# Patient Record
Sex: FEMALE | Race: WHITE | NOT HISPANIC OR LATINO | Employment: FULL TIME | URBAN - METROPOLITAN AREA
[De-identification: names, ages, dates, MRNs, and addresses within clinical notes are randomized per-mention and may not be internally consistent; named-entity substitution may affect disease eponyms.]

---

## 2022-04-10 ENCOUNTER — HISTORICAL (OUTPATIENT)
Dept: ADMINISTRATIVE | Facility: HOSPITAL | Age: 32
End: 2022-04-10

## 2022-04-27 VITALS
WEIGHT: 139.75 LBS | DIASTOLIC BLOOD PRESSURE: 70 MMHG | BODY MASS INDEX: 21.93 KG/M2 | OXYGEN SATURATION: 97 % | SYSTOLIC BLOOD PRESSURE: 107 MMHG | HEIGHT: 67 IN

## 2022-08-28 ENCOUNTER — LAB VISIT (OUTPATIENT)
Dept: LAB | Facility: HOSPITAL | Age: 32
End: 2022-08-28
Attending: OBSTETRICS & GYNECOLOGY
Payer: COMMERCIAL

## 2022-08-29 ENCOUNTER — HOSPITAL ENCOUNTER (OUTPATIENT)
Facility: HOSPITAL | Age: 32
LOS: 1 days | Discharge: HOME | End: 2022-08-29
Attending: OBSTETRICS & GYNECOLOGY | Admitting: OBSTETRICS & GYNECOLOGY
Payer: COMMERCIAL

## 2022-08-29 VITALS
HEART RATE: 67 BPM | DIASTOLIC BLOOD PRESSURE: 82 MMHG | HEIGHT: 67 IN | SYSTOLIC BLOOD PRESSURE: 116 MMHG | TEMPERATURE: 98 F | WEIGHT: 136.88 LBS | BODY MASS INDEX: 21.48 KG/M2 | RESPIRATION RATE: 18 BRPM

## 2022-08-29 DIAGNOSIS — O00.90 ECTOPIC PREGNANCY: ICD-10-CM

## 2022-08-29 LAB
ALBUMIN SERPL-MCNC: 4.1 GM/DL (ref 3.5–5)
ALBUMIN/GLOB SERPL: 1.4 RATIO (ref 1.1–2)
ALP SERPL-CCNC: 114 UNIT/L (ref 40–150)
ALT SERPL-CCNC: 21 UNIT/L (ref 0–55)
AST SERPL-CCNC: 19 UNIT/L (ref 5–34)
B-HCG FREE SERPL-ACNC: 1404.96 MIU/ML
BASOPHILS # BLD AUTO: 0.05 X10(3)/MCL (ref 0–0.2)
BASOPHILS NFR BLD AUTO: 0.8 %
BILIRUBIN DIRECT+TOT PNL SERPL-MCNC: 0.5 MG/DL
BUN SERPL-MCNC: 13.7 MG/DL (ref 7–18.7)
CALCIUM SERPL-MCNC: 9.8 MG/DL (ref 8.4–10.2)
CHLORIDE SERPL-SCNC: 108 MMOL/L (ref 98–107)
CO2 SERPL-SCNC: 27 MMOL/L (ref 22–29)
CREAT SERPL-MCNC: 0.72 MG/DL (ref 0.55–1.02)
EOSINOPHIL # BLD AUTO: 0.1 X10(3)/MCL (ref 0–0.9)
EOSINOPHIL NFR BLD AUTO: 1.5 %
ERYTHROCYTE [DISTWIDTH] IN BLOOD BY AUTOMATED COUNT: 12.3 % (ref 11.5–17)
GFR SERPLBLD CREATININE-BSD FMLA CKD-EPI: >60 MLS/MIN/1.73/M2
GLOBULIN SER-MCNC: 2.9 GM/DL (ref 2.4–3.5)
GLUCOSE SERPL-MCNC: 110 MG/DL (ref 74–100)
HCT VFR BLD AUTO: 46.5 % (ref 37–47)
HGB BLD-MCNC: 14.9 GM/DL (ref 12–16)
IMM GRANULOCYTES # BLD AUTO: 0.02 X10(3)/MCL (ref 0–0.04)
IMM GRANULOCYTES NFR BLD AUTO: 0.3 %
LYMPHOCYTES # BLD AUTO: 2.96 X10(3)/MCL (ref 0.6–4.6)
LYMPHOCYTES NFR BLD AUTO: 45 %
MCH RBC QN AUTO: 29.3 PG (ref 27–31)
MCHC RBC AUTO-ENTMCNC: 32 MG/DL (ref 33–36)
MCV RBC AUTO: 91.4 FL (ref 80–94)
MONOCYTES # BLD AUTO: 0.42 X10(3)/MCL (ref 0.1–1.3)
MONOCYTES NFR BLD AUTO: 6.4 %
NEUTROPHILS # BLD AUTO: 3 X10(3)/MCL (ref 2.1–9.2)
NEUTROPHILS NFR BLD AUTO: 46 %
NRBC BLD AUTO-RTO: 0 %
PLATELET # BLD AUTO: 235 X10(3)/MCL (ref 130–400)
PMV BLD AUTO: 9.9 FL (ref 7.4–10.4)
POTASSIUM SERPL-SCNC: 3.9 MMOL/L (ref 3.5–5.1)
PROT SERPL-MCNC: 7 GM/DL (ref 6.4–8.3)
RBC # BLD AUTO: 5.09 X10(6)/MCL (ref 4.2–5.4)
SODIUM SERPL-SCNC: 139 MMOL/L (ref 136–145)
WBC # SPEC AUTO: 6.6 X10(3)/MCL (ref 4.5–11.5)

## 2022-08-29 PROCEDURE — 63600175 PHARM REV CODE 636 W HCPCS: Performed by: OBSTETRICS & GYNECOLOGY

## 2022-08-29 PROCEDURE — 80053 COMPREHEN METABOLIC PANEL: CPT | Performed by: OBSTETRICS & GYNECOLOGY

## 2022-08-29 PROCEDURE — 85025 COMPLETE CBC W/AUTO DIFF WBC: CPT | Performed by: OBSTETRICS & GYNECOLOGY

## 2022-08-29 PROCEDURE — 36415 COLL VENOUS BLD VENIPUNCTURE: CPT | Performed by: OBSTETRICS & GYNECOLOGY

## 2022-08-29 PROCEDURE — 84702 CHORIONIC GONADOTROPIN TEST: CPT | Performed by: OBSTETRICS & GYNECOLOGY

## 2022-08-29 RX ORDER — PROCHLORPERAZINE EDISYLATE 5 MG/ML
5 INJECTION INTRAMUSCULAR; INTRAVENOUS EVERY 6 HOURS PRN
Status: DISCONTINUED | OUTPATIENT
Start: 2022-08-29 | End: 2022-08-29 | Stop reason: HOSPADM

## 2022-08-29 RX ORDER — METHOTREXATE 25 MG/ML
50 INJECTION INTRA-ARTERIAL; INTRAMUSCULAR; INTRATHECAL; INTRAVENOUS ONCE
Status: COMPLETED | OUTPATIENT
Start: 2022-08-29 | End: 2022-08-29

## 2022-08-29 RX ORDER — ONDANSETRON 4 MG/1
8 TABLET, ORALLY DISINTEGRATING ORAL EVERY 8 HOURS PRN
Status: DISCONTINUED | OUTPATIENT
Start: 2022-08-29 | End: 2022-08-29 | Stop reason: HOSPADM

## 2022-08-29 RX ADMIN — METHOTREXATE 85 MG: 25 SOLUTION INTRA-ARTERIAL; INTRAMUSCULAR; INTRATHECAL; INTRAVENOUS at 02:08

## 2022-09-01 ENCOUNTER — LAB VISIT (OUTPATIENT)
Dept: LAB | Facility: HOSPITAL | Age: 32
End: 2022-09-01
Attending: OBSTETRICS & GYNECOLOGY
Payer: COMMERCIAL

## 2022-09-01 DIAGNOSIS — O00.90 UNSPECIFIED ECTOPIC PREGNANCY WITHOUT INTRAUTERINE PREGNANCY: Primary | ICD-10-CM

## 2022-09-01 LAB — B-HCG FREE SERPL-ACNC: 1672.3 MIU/ML

## 2022-09-01 PROCEDURE — 36415 COLL VENOUS BLD VENIPUNCTURE: CPT

## 2022-09-01 PROCEDURE — 84702 CHORIONIC GONADOTROPIN TEST: CPT

## 2022-09-04 ENCOUNTER — HOSPITAL ENCOUNTER (OUTPATIENT)
Facility: HOSPITAL | Age: 32
Discharge: HOME | End: 2022-09-04
Attending: OBSTETRICS & GYNECOLOGY | Admitting: OBSTETRICS & GYNECOLOGY
Payer: COMMERCIAL

## 2022-09-04 VITALS
SYSTOLIC BLOOD PRESSURE: 115 MMHG | DIASTOLIC BLOOD PRESSURE: 76 MMHG | HEIGHT: 66 IN | TEMPERATURE: 98 F | BODY MASS INDEX: 21.76 KG/M2 | RESPIRATION RATE: 18 BRPM | WEIGHT: 135.38 LBS | HEART RATE: 66 BPM

## 2022-09-04 DIAGNOSIS — O00.90 ECTOPIC PREGNANCY: ICD-10-CM

## 2022-09-04 PROBLEM — O02.81 INAPPROPRIATE CHANGE IN QUANTITATIVE HCG IN EARLY PREGNANCY: Status: ACTIVE | Noted: 2022-09-04

## 2022-09-04 PROBLEM — O02.81 INAPPROPRIATE CHANGE IN QUANTITATIVE HCG IN EARLY PREGNANCY: Status: RESOLVED | Noted: 2022-09-04 | Resolved: 2022-09-04

## 2022-09-04 LAB
ALBUMIN SERPL-MCNC: 4.2 GM/DL (ref 3.5–5)
ALBUMIN/GLOB SERPL: 1.5 RATIO (ref 1.1–2)
ALP SERPL-CCNC: 108 UNIT/L (ref 40–150)
ALT SERPL-CCNC: 32 UNIT/L (ref 0–55)
AST SERPL-CCNC: 21 UNIT/L (ref 5–34)
BASOPHILS # BLD AUTO: 0.06 X10(3)/MCL (ref 0–0.2)
BASOPHILS NFR BLD AUTO: 0.7 %
BILIRUBIN DIRECT+TOT PNL SERPL-MCNC: 0.6 MG/DL
BUN SERPL-MCNC: 11.6 MG/DL (ref 7–18.7)
CALCIUM SERPL-MCNC: 10 MG/DL (ref 8.4–10.2)
CHLORIDE SERPL-SCNC: 106 MMOL/L (ref 98–107)
CO2 SERPL-SCNC: 26 MMOL/L (ref 22–29)
CREAT SERPL-MCNC: 0.75 MG/DL (ref 0.55–1.02)
EOSINOPHIL # BLD AUTO: 0.23 X10(3)/MCL (ref 0–0.9)
EOSINOPHIL NFR BLD AUTO: 2.7 %
ERYTHROCYTE [DISTWIDTH] IN BLOOD BY AUTOMATED COUNT: 12.1 % (ref 11.5–17)
GFR SERPLBLD CREATININE-BSD FMLA CKD-EPI: >60 MLS/MIN/1.73/M2
GLOBULIN SER-MCNC: 2.8 GM/DL (ref 2.4–3.5)
GLUCOSE SERPL-MCNC: 78 MG/DL (ref 74–100)
HCT VFR BLD AUTO: 46.8 % (ref 37–47)
HGB BLD-MCNC: 15 GM/DL (ref 12–16)
IMM GRANULOCYTES # BLD AUTO: 0.02 X10(3)/MCL (ref 0–0.04)
IMM GRANULOCYTES NFR BLD AUTO: 0.2 %
LYMPHOCYTES # BLD AUTO: 4.53 X10(3)/MCL (ref 0.6–4.6)
LYMPHOCYTES NFR BLD AUTO: 53.1 %
MCH RBC QN AUTO: 29 PG (ref 27–31)
MCHC RBC AUTO-ENTMCNC: 32.1 MG/DL (ref 33–36)
MCV RBC AUTO: 90.3 FL (ref 80–94)
MONOCYTES # BLD AUTO: 0.44 X10(3)/MCL (ref 0.1–1.3)
MONOCYTES NFR BLD AUTO: 5.2 %
NEUTROPHILS # BLD AUTO: 3.3 X10(3)/MCL (ref 2.1–9.2)
NEUTROPHILS NFR BLD AUTO: 38.1 %
NRBC BLD AUTO-RTO: 0 %
PLATELET # BLD AUTO: 249 X10(3)/MCL (ref 130–400)
PMV BLD AUTO: 10.1 FL (ref 7.4–10.4)
POTASSIUM SERPL-SCNC: 4.4 MMOL/L (ref 3.5–5.1)
PROT SERPL-MCNC: 7 GM/DL (ref 6.4–8.3)
RBC # BLD AUTO: 5.18 X10(6)/MCL (ref 4.2–5.4)
SODIUM SERPL-SCNC: 139 MMOL/L (ref 136–145)
WBC # SPEC AUTO: 8.5 X10(3)/MCL (ref 4.5–11.5)

## 2022-09-04 PROCEDURE — G0378 HOSPITAL OBSERVATION PER HR: HCPCS

## 2022-09-04 PROCEDURE — 36415 COLL VENOUS BLD VENIPUNCTURE: CPT | Performed by: OBSTETRICS & GYNECOLOGY

## 2022-09-04 PROCEDURE — 85025 COMPLETE CBC W/AUTO DIFF WBC: CPT | Performed by: OBSTETRICS & GYNECOLOGY

## 2022-09-04 PROCEDURE — 80053 COMPREHEN METABOLIC PANEL: CPT | Performed by: OBSTETRICS & GYNECOLOGY

## 2022-09-04 PROCEDURE — G0379 DIRECT REFER HOSPITAL OBSERV: HCPCS

## 2022-09-04 RX ORDER — ONDANSETRON 4 MG/1
8 TABLET, ORALLY DISINTEGRATING ORAL EVERY 8 HOURS PRN
Status: DISCONTINUED | OUTPATIENT
Start: 2022-09-04 | End: 2022-09-04 | Stop reason: HOSPADM

## 2022-09-04 RX ORDER — METHOTREXATE 25 MG/ML
50 INJECTION INTRA-ARTERIAL; INTRAMUSCULAR; INTRATHECAL; INTRAVENOUS ONCE
Status: COMPLETED | OUTPATIENT
Start: 2022-09-04 | End: 2022-09-04

## 2022-09-04 RX ORDER — PROCHLORPERAZINE EDISYLATE 5 MG/ML
5 INJECTION INTRAMUSCULAR; INTRAVENOUS EVERY 6 HOURS PRN
Status: DISCONTINUED | OUTPATIENT
Start: 2022-09-04 | End: 2022-09-04 | Stop reason: HOSPADM

## 2022-10-29 NOTE — H&P
[facility] - 2nd Floor Mother/Baby Unit  History & Physical  Gynecology    SUBJECTIVE:     Chief Complaint/Reason for Admission: Inappropriate HCG    History of Present Illness:  Patient is a 32 y.o.  who presents with abnormal HCG for MTX No complaints No pain no nausea no emesis.     No medications prior to admission.       Review of patient's allergies indicates:   Allergen Reactions    Codeine Rash       Past Medical History:   Diagnosis Date    Inappropriate change in quantitative hCG in early pregnancy 2022     No past surgical history on file.  No family history on file.       Review of Systems:  negative     OBJECTIVE:     Vital Signs (Most Recent):  Temp: 98.1 °F (36.7 °C) (22)  Pulse: 66 (22)  Resp: 18 (22)  BP: 115/76 (22)    Physical Exam:  General: well developed, well nourished  Eyes: conjunctivae/corneas clear. PERRL..  Lungs:  clear to auscultation bilaterally and normal respiratory effort  Cardiovascular: Heart: regular rate and rhythm, S1, S2 normal, no murmur, click, rub or gallop. Chest Wall: no tenderness. Extremities: no cyanosis or edema, or clubbing. Pulses: 2+ and symmetric.  Abdomen/Rectal: Abdomen: soft, non-tender non-distented; bowel sounds normal; no masses,  no organomegaly. Rectal: normal tone, no masses or tenderness  Skin: Skin color, texture, turgor normal. No rashes or lesions  Musculoskeletal:no clubbing, cyanosis  Lymph Nodes: No cervical or supraclavicular adenopathy  Psych/Behavioral:  Normal.    Laboratory:  I have reviewed all pertinent lab results within the past 24 hours.          ASSESSMENT/PLAN:     Active Hospital Problems    Diagnosis  POA    *Inappropriate change in quantitative hCG in early pregnancy [O02.81]  Unknown      Resolved Hospital Problems   No resolved problems to display.       MTX therapy today after labs  R/b/I for management reviewd   Consents signed

## 2022-10-29 NOTE — DISCHARGE SUMMARY
[facility] - 2nd Floor Mother/Baby Unit  Discharge Summary  OBGYN    Admission date: 9/4/2022  Discharge date: 10/29/2022    Admit Dx:      Patient Active Problem List   Diagnosis    Inappropriate change in quantitative hCG in early pregnancy     Discharge Dx:    Patient Active Problem List   Diagnosis    Inappropriate change in quantitative hCG in early pregnancy       Attending Physician: Megan att. providers found   Discharge Provider: Yanira Alfaro    Procedures Performed: * No surgery found *    Hospital Course: Patient was admitted on 9/4/2022 for MTX therapy after abnormal rise of HCG.  Hospital course was uncomplicated.  On the date of discharge, Ms. Small was able to tolerated po, ambulate without difficulty, and her pain was well controlled. At that point she was afebrile, and her labs were stable. She was discharged to home in stable condition.      Consults: none      Discharged Condition: stable    Disposition: Home    Follow Up:    Follow-up Information       Yohana Lee MD. Call.    Specialty: Obstetrics and Gynecology  Contact information:  [address] [place] [id number]  [phone]                             Medications:  There are no discharge medications for this patient.      No discharge procedures on file.

## 2024-02-29 NOTE — H&P
[facility] - 2nd Floor Mother/Baby Unit  History & Physical  Gynecology    SUBJECTIVE:     Chief Complaint/Reason for Admission: Ectopic pregnancy    History of Present Illness:  Patient is a 32 y.o. No obstetric history on file. who presents with extopic pregnancy for medical mgmt.     No medications prior to admission.       Review of patient's allergies indicates:   Allergen Reactions    Codeine Rash       No past medical history on file.  No past surgical history on file.  No family history on file.       Review of Systems:  Review of systems not obtained due to patient factors  .     OBJECTIVE:     Vital Signs (Most Recent):  Temp: 98.3 °F (36.8 °C) (08/29/22 1030)  Pulse: 69 (08/29/22 1030)  Resp: 17 (08/29/22 1030)  BP: 110/75 (08/29/22 1030)    Physical Exam:  General: well developed, well nourished  Eyes: conjunctivae/corneas clear. PERRL..  HENT: Head:normocephalic, atraumatic. Ears:bilateral TM's and external ear canals normal. Nose: Nares normal. Septum midline. Mucosa normal. No drainage or sinus tenderness., no discharge. Throat: lips, mucosa, and tongue normal; teeth and gums normal and no throat erythema.  Neck: supple, symmetrical, trachea midline, no JVD and thyroid not enlarged, symmetric, no tenderness/mass/nodules  Lungs:  clear to auscultation bilaterally and normal respiratory effort  Cardiovascular: Heart: regular rate and rhythm, S1, S2 normal, no murmur, click, rub or gallop. Chest Wall: no tenderness. Extremities: no cyanosis or edema, or clubbing. Pulses: 2+ and symmetric.  Breasts:  deferred  Abdomen/Rectal: Abdomen: soft, non-tender non-distented; bowel sounds normal; no masses,  no organomegaly. Rectal: normal tone, no masses or tenderness and no stool in vault  Pelvic:  deferred  Skin: Skin color, texture, turgor normal. No rashes or lesions  Musculoskeletal:normal gait  Lymph Nodes: No cervical or supraclavicular adenopathy  Neurologic: Normal strength and tone. No  focal numbness or weakness  Psych/Behavioral:  Normal.    Laboratory:  pending    Diagnostic Results:  US: Reviewed    2cm adnexal mass on right   ASSESSMENT/PLAN:     There are no hospital problems to display for this patient.    Pt with lab and u/s findings c/w ectopic pregnancy.  Samara and tyler discussed iw pt in office and admitted for MTX  Advised on R/B/A of MTX therapy for ectopic pregnancy. All questions answered   Is This A New Presentation, Or A Follow-Up?: Skin Lesions How Severe Is Your Skin Lesion?: mild Have Your Skin Lesions Been Treated?: not been treated

## 2025-05-22 ENCOUNTER — HOSPITAL ENCOUNTER (OUTPATIENT)
Dept: RADIOLOGY | Facility: HOSPITAL | Age: 35
Discharge: HOME | End: 2025-05-22
Attending: OTOLARYNGOLOGY
Payer: COMMERCIAL

## 2025-05-22 DIAGNOSIS — Z01.818 OTHER SPECIFIED PRE-OPERATIVE EXAMINATION: Primary | ICD-10-CM

## 2025-05-22 DIAGNOSIS — Z01.818 OTHER SPECIFIED PRE-OPERATIVE EXAMINATION: ICD-10-CM

## 2025-05-22 DIAGNOSIS — Z79.01 LONG TERM (CURRENT) USE OF ANTICOAGULANTS: ICD-10-CM

## 2025-05-22 PROCEDURE — 71046 X-RAY EXAM CHEST 2 VIEWS: CPT | Mod: TC

## 2025-06-03 RX ORDER — SPIRONOLACTONE 100 MG/1
100 TABLET, FILM COATED ORAL
COMMUNITY
Start: 2025-03-30

## 2025-06-11 ENCOUNTER — HOSPITAL ENCOUNTER (OUTPATIENT)
Facility: HOSPITAL | Age: 35
Discharge: HOME | End: 2025-06-11
Attending: OTOLARYNGOLOGY | Admitting: OTOLARYNGOLOGY
Payer: COMMERCIAL

## 2025-06-11 ENCOUNTER — ANESTHESIA EVENT (OUTPATIENT)
Facility: HOSPITAL | Age: 35
End: 2025-06-11
Payer: COMMERCIAL

## 2025-06-11 ENCOUNTER — ANESTHESIA (OUTPATIENT)
Facility: HOSPITAL | Age: 35
End: 2025-06-11
Payer: COMMERCIAL

## 2025-06-11 DIAGNOSIS — Z41.1 ENCOUNTER FOR COSMETIC SURGERY: ICD-10-CM

## 2025-06-11 PROCEDURE — 25000003 PHARM REV CODE 250: Performed by: OTOLARYNGOLOGY

## 2025-06-11 PROCEDURE — 25000003 PHARM REV CODE 250

## 2025-06-11 PROCEDURE — 27201423 OPTIME MED/SURG SUP & DEVICES STERILE SUPPLY: Performed by: OTOLARYNGOLOGY

## 2025-06-11 PROCEDURE — 63600175 PHARM REV CODE 636 W HCPCS: Performed by: ANESTHESIOLOGY

## 2025-06-11 PROCEDURE — 37000009 HC ANESTHESIA EA ADD 15 MINS: Performed by: OTOLARYNGOLOGY

## 2025-06-11 PROCEDURE — 71000015 HC POSTOP RECOV 1ST HR: Performed by: OTOLARYNGOLOGY

## 2025-06-11 PROCEDURE — 63600175 PHARM REV CODE 636 W HCPCS: Performed by: OTOLARYNGOLOGY

## 2025-06-11 PROCEDURE — 71000016 HC POSTOP RECOV ADDL HR: Performed by: OTOLARYNGOLOGY

## 2025-06-11 PROCEDURE — 36000707: Mod: WS

## 2025-06-11 PROCEDURE — 27800903 OPTIME MED/SURG SUP & DEVICES OTHER IMPLANTS: Performed by: OTOLARYNGOLOGY

## 2025-06-11 PROCEDURE — 36000707: Performed by: OTOLARYNGOLOGY

## 2025-06-11 PROCEDURE — 37000009 HC ANESTHESIA EA ADD 15 MINS: Mod: WS

## 2025-06-11 PROCEDURE — 25000003 PHARM REV CODE 250: Performed by: NURSE ANESTHETIST, CERTIFIED REGISTERED

## 2025-06-11 PROCEDURE — C9046 COCAINE HCL NASAL SOLUTION: HCPCS | Performed by: OTOLARYNGOLOGY

## 2025-06-11 PROCEDURE — 71000033 HC RECOVERY, INTIAL HOUR: Performed by: OTOLARYNGOLOGY

## 2025-06-11 PROCEDURE — 37000008 HC ANESTHESIA 1ST 15 MINUTES: Performed by: OTOLARYNGOLOGY

## 2025-06-11 PROCEDURE — 36000706: Performed by: OTOLARYNGOLOGY

## 2025-06-11 PROCEDURE — 63600175 PHARM REV CODE 636 W HCPCS: Performed by: NURSE ANESTHETIST, CERTIFIED REGISTERED

## 2025-06-11 DEVICE — IMPLANTABLE DEVICE: Type: IMPLANTABLE DEVICE | Site: NOSE | Status: FUNCTIONAL

## 2025-06-11 RX ORDER — HYDROMORPHONE HYDROCHLORIDE 2 MG/ML
0.2 INJECTION, SOLUTION INTRAMUSCULAR; INTRAVENOUS; SUBCUTANEOUS EVERY 5 MIN PRN
Status: DISCONTINUED | OUTPATIENT
Start: 2025-06-11 | End: 2025-06-11 | Stop reason: HOSPADM

## 2025-06-11 RX ORDER — PROCHLORPERAZINE EDISYLATE 5 MG/ML
INJECTION INTRAMUSCULAR; INTRAVENOUS
Status: DISCONTINUED
Start: 2025-06-11 | End: 2025-06-11 | Stop reason: HOSPADM

## 2025-06-11 RX ORDER — OXYMETAZOLINE HCL 0.05 %
2 SPRAY, NON-AEROSOL (ML) NASAL ONCE
Status: COMPLETED | OUTPATIENT
Start: 2025-06-11 | End: 2025-06-11

## 2025-06-11 RX ORDER — LIDOCAINE HYDROCHLORIDE 10 MG/ML
INJECTION, SOLUTION EPIDURAL; INFILTRATION; INTRACAUDAL; PERINEURAL
Status: DISCONTINUED | OUTPATIENT
Start: 2025-06-11 | End: 2025-06-11

## 2025-06-11 RX ORDER — TRANEXAMIC ACID 1 G/10ML
INJECTION, SOLUTION INTRAVENOUS
Status: DISCONTINUED | OUTPATIENT
Start: 2025-06-11 | End: 2025-06-11 | Stop reason: HOSPADM

## 2025-06-11 RX ORDER — HYDROMORPHONE HYDROCHLORIDE 2 MG/ML
INJECTION, SOLUTION INTRAMUSCULAR; INTRAVENOUS; SUBCUTANEOUS
Status: DISCONTINUED
Start: 2025-06-11 | End: 2025-06-11 | Stop reason: HOSPADM

## 2025-06-11 RX ORDER — TRANEXAMIC ACID 1 G/10ML
INJECTION, SOLUTION INTRAVENOUS
Status: DISCONTINUED
Start: 2025-06-11 | End: 2025-06-11 | Stop reason: HOSPADM

## 2025-06-11 RX ORDER — MUPIROCIN 20 MG/G
OINTMENT TOPICAL
Status: DISCONTINUED | OUTPATIENT
Start: 2025-06-11 | End: 2025-06-11 | Stop reason: HOSPADM

## 2025-06-11 RX ORDER — CEFAZOLIN 2 G/1
INJECTION, POWDER, FOR SOLUTION INTRAMUSCULAR; INTRAVENOUS
Status: COMPLETED
Start: 2025-06-11 | End: 2025-06-11

## 2025-06-11 RX ORDER — LIDOCAINE HYDROCHLORIDE AND EPINEPHRINE 10; 10 UG/ML; MG/ML
INJECTION, SOLUTION INFILTRATION; PERINEURAL
Status: DISCONTINUED | OUTPATIENT
Start: 2025-06-11 | End: 2025-06-11 | Stop reason: HOSPADM

## 2025-06-11 RX ORDER — SCOPOLAMINE 1 MG/3D
PATCH, EXTENDED RELEASE TRANSDERMAL
Status: COMPLETED
Start: 2025-06-11 | End: 2025-06-11

## 2025-06-11 RX ORDER — GLUCAGON 1 MG
1 KIT INJECTION
Status: DISCONTINUED | OUTPATIENT
Start: 2025-06-11 | End: 2025-06-11 | Stop reason: HOSPADM

## 2025-06-11 RX ORDER — EPHEDRINE SULFATE 50 MG/ML
INJECTION, SOLUTION INTRAVENOUS
Status: DISCONTINUED | OUTPATIENT
Start: 2025-06-11 | End: 2025-06-11

## 2025-06-11 RX ORDER — FENTANYL CITRATE 50 UG/ML
INJECTION, SOLUTION INTRAMUSCULAR; INTRAVENOUS
Status: DISCONTINUED | OUTPATIENT
Start: 2025-06-11 | End: 2025-06-11

## 2025-06-11 RX ORDER — TRANEXAMIC ACID 1 G/10ML
INJECTION, SOLUTION INTRAVENOUS
Status: DISCONTINUED | OUTPATIENT
Start: 2025-06-11 | End: 2025-06-11

## 2025-06-11 RX ORDER — CLINDAMYCIN PHOSPHATE 150 MG/ML
INJECTION, SOLUTION INTRAVENOUS
Status: DISCONTINUED
Start: 2025-06-11 | End: 2025-06-11 | Stop reason: WASHOUT

## 2025-06-11 RX ORDER — MIDAZOLAM HYDROCHLORIDE 2 MG/2ML
INJECTION, SOLUTION INTRAMUSCULAR; INTRAVENOUS
Status: COMPLETED
Start: 2025-06-11 | End: 2025-06-11

## 2025-06-11 RX ORDER — CEFAZOLIN 2 G/1
2 INJECTION, POWDER, FOR SOLUTION INTRAMUSCULAR; INTRAVENOUS ONCE
Status: COMPLETED | OUTPATIENT
Start: 2025-06-11 | End: 2025-06-11

## 2025-06-11 RX ORDER — EPINEPHRINE NASAL SOLUTION 1 MG/ML
SOLUTION NASAL
Status: DISCONTINUED
Start: 2025-06-11 | End: 2025-06-11 | Stop reason: HOSPADM

## 2025-06-11 RX ORDER — GENTAMICIN 40 MG/ML
INJECTION, SOLUTION INTRAMUSCULAR; INTRAVENOUS
Status: DISCONTINUED | OUTPATIENT
Start: 2025-06-11 | End: 2025-06-11 | Stop reason: HOSPADM

## 2025-06-11 RX ORDER — ONDANSETRON HYDROCHLORIDE 2 MG/ML
INJECTION, SOLUTION INTRAVENOUS
Status: DISCONTINUED
Start: 2025-06-11 | End: 2025-06-11 | Stop reason: HOSPADM

## 2025-06-11 RX ORDER — MUPIROCIN 20 MG/G
OINTMENT TOPICAL
Status: DISCONTINUED
Start: 2025-06-11 | End: 2025-06-11 | Stop reason: HOSPADM

## 2025-06-11 RX ORDER — ONDANSETRON HYDROCHLORIDE 2 MG/ML
INJECTION, SOLUTION INTRAMUSCULAR; INTRAVENOUS
Status: DISCONTINUED | OUTPATIENT
Start: 2025-06-11 | End: 2025-06-11

## 2025-06-11 RX ORDER — MIDAZOLAM HYDROCHLORIDE 1 MG/ML
INJECTION INTRAMUSCULAR; INTRAVENOUS
Status: DISCONTINUED | OUTPATIENT
Start: 2025-06-11 | End: 2025-06-11

## 2025-06-11 RX ORDER — PROCHLORPERAZINE EDISYLATE 5 MG/ML
2.5 INJECTION INTRAMUSCULAR; INTRAVENOUS EVERY 6 HOURS PRN
Status: DISCONTINUED | OUTPATIENT
Start: 2025-06-11 | End: 2025-06-11 | Stop reason: HOSPADM

## 2025-06-11 RX ORDER — METHYLENE BLUE 5 MG/ML
INJECTION, SOLUTION INTRAVENOUS
Status: DISCONTINUED
Start: 2025-06-11 | End: 2025-06-11 | Stop reason: HOSPADM

## 2025-06-11 RX ORDER — GENTAMICIN 40 MG/ML
INJECTION, SOLUTION INTRAMUSCULAR; INTRAVENOUS
Status: DISCONTINUED
Start: 2025-06-11 | End: 2025-06-11 | Stop reason: HOSPADM

## 2025-06-11 RX ORDER — OXYCODONE AND ACETAMINOPHEN 5; 325 MG/1; MG/1
1 TABLET ORAL
Status: DISCONTINUED | OUTPATIENT
Start: 2025-06-11 | End: 2025-06-11 | Stop reason: HOSPADM

## 2025-06-11 RX ORDER — ACETAMINOPHEN 10 MG/ML
INJECTION, SOLUTION INTRAVENOUS
Status: DISCONTINUED | OUTPATIENT
Start: 2025-06-11 | End: 2025-06-11

## 2025-06-11 RX ORDER — COCAINE HYDROCHLORIDE 40 MG/ML
SOLUTION NASAL
Status: DISCONTINUED
Start: 2025-06-11 | End: 2025-06-11 | Stop reason: HOSPADM

## 2025-06-11 RX ORDER — COCAINE HYDROCHLORIDE 40 MG/ML
SOLUTION NASAL
Status: DISCONTINUED | OUTPATIENT
Start: 2025-06-11 | End: 2025-06-11 | Stop reason: HOSPADM

## 2025-06-11 RX ORDER — SODIUM CHLORIDE, SODIUM LACTATE, POTASSIUM CHLORIDE, CALCIUM CHLORIDE 600; 310; 30; 20 MG/100ML; MG/100ML; MG/100ML; MG/100ML
INJECTION, SOLUTION INTRAVENOUS CONTINUOUS
Status: DISCONTINUED | OUTPATIENT
Start: 2025-06-11 | End: 2025-06-11 | Stop reason: HOSPADM

## 2025-06-11 RX ORDER — SEVOFLURANE 250 ML/250ML
LIQUID RESPIRATORY (INHALATION)
Status: DISCONTINUED
Start: 2025-06-11 | End: 2025-06-11 | Stop reason: HOSPADM

## 2025-06-11 RX ORDER — PROPOFOL 10 MG/ML
VIAL (ML) INTRAVENOUS
Status: DISCONTINUED | OUTPATIENT
Start: 2025-06-11 | End: 2025-06-11

## 2025-06-11 RX ORDER — HALOPERIDOL LACTATE 5 MG/ML
0.5 INJECTION, SOLUTION INTRAMUSCULAR EVERY 10 MIN PRN
Status: DISCONTINUED | OUTPATIENT
Start: 2025-06-11 | End: 2025-06-11 | Stop reason: HOSPADM

## 2025-06-11 RX ORDER — ROCURONIUM BROMIDE 10 MG/ML
INJECTION, SOLUTION INTRAVENOUS
Status: DISCONTINUED | OUTPATIENT
Start: 2025-06-11 | End: 2025-06-11

## 2025-06-11 RX ORDER — ONDANSETRON HYDROCHLORIDE 2 MG/ML
4 INJECTION, SOLUTION INTRAVENOUS ONCE
Status: COMPLETED | OUTPATIENT
Start: 2025-06-11 | End: 2025-06-11

## 2025-06-11 RX ORDER — DEXAMETHASONE SODIUM PHOSPHATE 4 MG/ML
INJECTION, SOLUTION INTRA-ARTICULAR; INTRALESIONAL; INTRAMUSCULAR; INTRAVENOUS; SOFT TISSUE
Status: DISCONTINUED | OUTPATIENT
Start: 2025-06-11 | End: 2025-06-11

## 2025-06-11 RX ORDER — LIDOCAINE HYDROCHLORIDE AND EPINEPHRINE 10; 10 UG/ML; MG/ML
INJECTION, SOLUTION INFILTRATION; PERINEURAL
Status: DISCONTINUED
Start: 2025-06-11 | End: 2025-06-11 | Stop reason: HOSPADM

## 2025-06-11 RX ORDER — HYDRALAZINE HYDROCHLORIDE 20 MG/ML
10 INJECTION INTRAMUSCULAR; INTRAVENOUS ONCE
Status: DISCONTINUED | OUTPATIENT
Start: 2025-06-11 | End: 2025-06-11 | Stop reason: HOSPADM

## 2025-06-11 RX ORDER — HYDROMORPHONE HYDROCHLORIDE 2 MG/ML
INJECTION, SOLUTION INTRAMUSCULAR; INTRAVENOUS; SUBCUTANEOUS
Status: DISCONTINUED | OUTPATIENT
Start: 2025-06-11 | End: 2025-06-11

## 2025-06-11 RX ORDER — OXYMETAZOLINE HCL 0.05 %
SPRAY, NON-AEROSOL (ML) NASAL
Status: DISCONTINUED
Start: 2025-06-11 | End: 2025-06-11 | Stop reason: HOSPADM

## 2025-06-11 RX ORDER — EPINEPHRINE 1 MG/ML
INJECTION INTRAMUSCULAR; INTRAVENOUS; SUBCUTANEOUS
Status: DISCONTINUED | OUTPATIENT
Start: 2025-06-11 | End: 2025-06-11 | Stop reason: HOSPADM

## 2025-06-11 RX ORDER — SCOPOLAMINE 1 MG/3D
1 PATCH, EXTENDED RELEASE TRANSDERMAL
Status: DISCONTINUED | OUTPATIENT
Start: 2025-06-11 | End: 2025-06-11 | Stop reason: HOSPADM

## 2025-06-11 RX ADMIN — SODIUM CHLORIDE, POTASSIUM CHLORIDE, SODIUM LACTATE AND CALCIUM CHLORIDE: 600; 310; 30; 20 INJECTION, SOLUTION INTRAVENOUS at 07:06

## 2025-06-11 RX ADMIN — SODIUM CHLORIDE, POTASSIUM CHLORIDE, SODIUM LACTATE AND CALCIUM CHLORIDE: 600; 310; 30; 20 INJECTION, SOLUTION INTRAVENOUS at 06:06

## 2025-06-11 RX ADMIN — TRANEXAMIC ACID 1000 MG: 100 INJECTION, SOLUTION INTRAVENOUS at 08:06

## 2025-06-11 RX ADMIN — DEXAMETHASONE SODIUM PHOSPHATE 12 MG: 4 INJECTION, SOLUTION INTRA-ARTICULAR; INTRALESIONAL; INTRAMUSCULAR; INTRAVENOUS; SOFT TISSUE at 08:06

## 2025-06-11 RX ADMIN — ACETAMINOPHEN 1000 MG: 10 INJECTION, SOLUTION INTRAVENOUS at 10:06

## 2025-06-11 RX ADMIN — LIDOCAINE HYDROCHLORIDE 50 MG: 10 INJECTION, SOLUTION EPIDURAL; INFILTRATION; INTRACAUDAL; PERINEURAL at 08:06

## 2025-06-11 RX ADMIN — ROCURONIUM BROMIDE 40 MG: 10 INJECTION, SOLUTION INTRAVENOUS at 08:06

## 2025-06-11 RX ADMIN — ONDANSETRON HYDROCHLORIDE 4 MG: 2 SOLUTION INTRAMUSCULAR; INTRAVENOUS at 11:06

## 2025-06-11 RX ADMIN — DEXMEDETOMIDINE HYDROCHLORIDE 6 MCG: 400 INJECTION INTRAVENOUS at 10:06

## 2025-06-11 RX ADMIN — ONDANSETRON 4 MG: 2 INJECTION INTRAMUSCULAR; INTRAVENOUS at 12:06

## 2025-06-11 RX ADMIN — SODIUM CHLORIDE, POTASSIUM CHLORIDE, SODIUM LACTATE AND CALCIUM CHLORIDE: 600; 310; 30; 20 INJECTION, SOLUTION INTRAVENOUS at 08:06

## 2025-06-11 RX ADMIN — PROPOFOL 150 MG: 10 INJECTION, EMULSION INTRAVENOUS at 08:06

## 2025-06-11 RX ADMIN — ROCURONIUM BROMIDE 10 MG: 10 INJECTION, SOLUTION INTRAVENOUS at 08:06

## 2025-06-11 RX ADMIN — MIDAZOLAM HYDROCHLORIDE 2 MG: 1 INJECTION, SOLUTION INTRAMUSCULAR; INTRAVENOUS at 07:06

## 2025-06-11 RX ADMIN — HYDROMORPHONE HYDROCHLORIDE 0.2 MG: 2 INJECTION INTRAMUSCULAR; INTRAVENOUS; SUBCUTANEOUS at 02:06

## 2025-06-11 RX ADMIN — EPHEDRINE SULFATE 5 MG: 50 INJECTION INTRAVENOUS at 09:06

## 2025-06-11 RX ADMIN — DEXMEDETOMIDINE HYDROCHLORIDE 4 MCG: 400 INJECTION INTRAVENOUS at 11:06

## 2025-06-11 RX ADMIN — DEXMEDETOMIDINE HYDROCHLORIDE 6 MCG: 400 INJECTION INTRAVENOUS at 08:06

## 2025-06-11 RX ADMIN — HYDROMORPHONE HYDROCHLORIDE 0.6 MG: 2 INJECTION, SOLUTION INTRAMUSCULAR; INTRAVENOUS; SUBCUTANEOUS at 11:06

## 2025-06-11 RX ADMIN — FENTANYL CITRATE 100 MCG: 50 INJECTION, SOLUTION INTRAMUSCULAR; INTRAVENOUS at 08:06

## 2025-06-11 RX ADMIN — OXYMETAZOLINE HYDROCHLORIDE 2 SPRAY: 0.05 SPRAY NASAL at 07:06

## 2025-06-11 RX ADMIN — CEFAZOLIN 2 G: 2 INJECTION, POWDER, FOR SOLUTION INTRAMUSCULAR; INTRAVENOUS at 07:06

## 2025-06-11 RX ADMIN — PROCHLORPERAZINE EDISYLATE 2.5 MG: 5 INJECTION INTRAMUSCULAR; INTRAVENOUS at 02:06

## 2025-06-11 RX ADMIN — SCOPOLAMINE 1 PATCH: 1 PATCH TRANSDERMAL at 07:06

## 2025-06-11 RX ADMIN — SUGAMMADEX 150 MG: 100 INJECTION, SOLUTION INTRAVENOUS at 11:06

## 2025-06-11 NOTE — DISCHARGE SUMMARY
PATIENT NAME:       VANESSA COLLINS  YOB: 1990  CSN:                [id number]   MRN:                [id number]  ADMIT DATE:         06/11/2025 06:04:00  PHYSICIAN:          Benjamin Russell                          DISCHARGE SUMMARY    DATE OF DISCHARGE:      Vanessa is status post revision nasal surgery and doing well.  We will discharge   her home to be followed in the office in 5 days on standard postoperative   medication instructions.        ______________________________  Benjamin Russell    JJJ/AQS  DD:  06/11/2025  Time:  11:16AM  DT:  06/11/2025  Time:  12:49PM  Job #:  [id number]      DISCHARGE SUMMARY

## 2025-06-11 NOTE — TRANSFER OF CARE
"Anesthesia Transfer of Care Note    Patient: Vanessa Small    Procedure(s) Performed: Procedure(s) (LRB):  RHINOPLASTY     //////CASH PROCEDURE/////IHCC Graft will be required; Rhinoplasty; Septoplasty; Repair of Nasal Septum Perforation; Septal Cartilage Graft; IHCC Graft; Temporalis Fascia Graft (N/A)  SEPTOPLASTY, NOSE (N/A)  REPAIR, PERFORATION, NASAL SEPTUM (N/A)  APPLICATION, GRAFT, CARTILAGE (N/A)  APPLICATION, GRAFT, CARTILAGE, NASAL SEPTUM (N/A)  PROCEDURE, GRAFT, FASCIA (N/A)    Patient location: PACU    Anesthesia Type: general    Transport from OR: Transported from OR on room air with adequate spontaneous ventilation    Post pain: adequate analgesia    Post assessment: no apparent anesthetic complications    Post vital signs: stable    Level of consciousness: sedated    Nausea/Vomiting: no nausea/vomiting    Complications: none    Transfer of care protocol was followed      Last vitals: Visit Vitals  /71   Pulse 72   Temp 36.8 °C (98.2 °F) (Oral)   Ht 5' 6" (1.676 m)   Wt 61.2 kg (135 lb)   LMP 05/01/2025 (Exact Date)   SpO2 100%   Breastfeeding No   BMI 21.79 kg/m²     "

## 2025-06-11 NOTE — ANESTHESIA PREPROCEDURE EVALUATION
"                                                                                                             06/11/2025  Vanessa Small is a 35 y.o., female here today for a rhinoplasty with Dr. Russell. She has no chronic health problems and is feeling well today and ready to proceed.     Height: 5' 6" (1.676 m) (06/03/25)   Weight: 61.2 kg (135 lb) (06/03/25)   BMI: 21.8 (06/03/25)   NPO Status: 1900   Allergies: No Known Allergies     Pre Vitals  Current as of 06/11/25 0700  BP: 102/71 Pulse: 72   Resp: SpO2: 100   Temp: 36.8 °C (98.2 °F)     Past Medical History   Inappropriate change in quantitative hCG in early pregnancy      Prescription Medications  Within last 14 days from 06/11/25   Last Taken Last Updated   azelaic acid (AZELEX) 15 % gel        azithromycin (Z-FLORENTINO) 250 MG tablet        omega-3 fatty acids-fish oil 435-880 mg Cap    Taking 05/01/24 0838   prenatal no122/iron/folic acid (PRENATAL MULTI ORAL)    Taking 05/01/24 0838   spironolactone (ALDACTONE) 100 MG tablet    6/10/2025 06/11/25 0635        Surgical History  DILATION AND CURETTAGE OF UTERUS TONSILLECTOMY   SINUS SURGERY         Latest Reference Range & Units 05/22/25 12:14   WBC 4.50 - 11.50 x10(3)/mcL 8.45   RBC 4.20 - 5.40 x10(6)/mcL 5.21   Hemoglobin 12.0 - 16.0 g/dL 14.9   Hematocrit 37.0 - 47.0 % 46.0   Platelet Count 130 - 400 x10(3)/mcL 309   PT 12.5 - 14.5 seconds 13.0   INR <=1.3  1.0   PTT 23.2 - 33.7 seconds 28.8   Sodium 136 - 145 mmol/L 139   Potassium 3.5 - 5.1 mmol/L 4.2   Chloride 98 - 107 mmol/L 103   CO2 22 - 29 mmol/L 26   Anion Gap mEq/L 10.0   BUN 7.0 - 18.7 mg/dL 20.3 (H)   Creatinine 0.55 - 1.02 mg/dL 0.79   BUN/CREAT RATIO  26   eGFR mL/min/1.73/m2 >60   Glucose 74 - 100 mg/dL 66 (L)   Calcium 8.4 - 10.2 mg/dL 9.5         Pre-op Assessment    I have reviewed the Patient Summary Reports.     I have reviewed the Nursing Notes. I have reviewed the NPO Status.   I have reviewed the Medications.     Review of " Systems  Anesthesia Hx:  No problems with previous Anesthesia   Neg history of prior surgery.          Denies Family Hx of Anesthesia complications.    Denies Personal Hx of Anesthesia complications.                    Social:  Non-Smoker, Social Alcohol Use       Hematology/Oncology:    Oncology Normal    -- Denies Anemia:                                  EENT/Dental:  EENT/Dental Normal           Cardiovascular:  Exercise tolerance: good    Denies Hypertension.       Denies Angina.                                    Pulmonary:     Denies Asthma.   Denies Shortness of breath.   Denies Sleep Apnea.                Renal/:   Denies Chronic Renal Disease.                Hepatic/GI:      Denies GERD.    Not Taking GLP-1 Agonists            Musculoskeletal:  Musculoskeletal Normal                Neurological:    Denies CVA.                                    Endocrine:  Denies Diabetes.         Denies Obesity / BMI > 30  Dermatological:  Skin Normal    Psych:  Psychiatric Normal                    Physical Exam  General: Well nourished, Cooperative, Alert and Oriented    Airway:  Mallampati: II / I  Mouth Opening: Normal  TM Distance: Normal  Tongue: Normal  Neck ROM: Normal ROM    Dental:  Intact    Chest/Lungs:  Clear to auscultation, Normal Respiratory Rate    Heart:  Rate: Normal  Rhythm: Regular Rhythm  Sounds: Normal    Abdomen:  Normal, Soft, Nontender        Anesthesia Plan  Type of Anesthesia, risks & benefits discussed:    Anesthesia Type: Gen ETT  Intra-op Monitoring Plan: Standard ASA Monitors  Post Op Pain Control Plan: IV/PO Opioids PRN and multimodal analgesia  Induction:  IV  Airway Plan: Direct  Informed Consent: Informed consent signed with the Patient and all parties understand the risks and agree with anesthesia plan.  All questions answered.   ASA Score: 1  Day of Surgery Review of History & Physical: H&P Update referred to the surgeon/provider.    Ready For Surgery From Anesthesia Perspective.      .

## 2025-06-11 NOTE — ANESTHESIA PROCEDURE NOTES
Intubation    Date/Time: 6/11/2025 8:11 AM    Performed by: Bharati Velasco CRNA  Authorized by: John Gaona MD    Intubation:     Induction:  Intravenous    Intubated:  Postinduction    Mask Ventilation:  Easy mask    Attempts:  1    Attempted By:  CRNA    Blade:  Sanders 2    Laryngeal View Grade: Grade I - full view of cords      Difficult Airway Encountered?: No      Complications:  None    Airway Device:  Oral endotracheal tube    Airway Device Size:  6.5    Style/Cuff Inflation:  Cuffed (inflated to minimal occlusive pressure)    Tube secured:  21    Secured at:  The lips    Placement Verified By:  Capnometry    Complicating Factors:  None    Findings Post-Intubation:  BS equal bilateral and atraumatic/condition of teeth unchanged

## 2025-06-11 NOTE — OP NOTE
[facility]                         [address] [address]    PATIENT NAME:      ANETA COLLINS  YOB: 1990  CSN:               [id number]  MRN:               [id number]  ADMIT DATE:        06/11/2025 06:04:00  PHYSICIAN:         Benjamin Russell                          OPERATIVE REPORT      DATE OF SURGERY:   6/11/2025     SURGEON:  Benjamin Russell    PREOPERATIVE DIAGNOSES:  Nasal obstruction, nasal deformity.    POSTOPERATIVE DIAGNOSES:  Nasal obstruction, nasal deformity.    PROCEDURE:  Septoplasty, cosmetic Rhinoplasty    ASSISTANT:  Alicia Wong RN.    PROCEDURE IN DETAIL:  The patient was brought to the operating room and placed   in supine position.  After achieving general endotracheal anesthesia, eyes were   protected with tape.  A throat pack was placed in the oropharynx after 1%   lidocaine with 1:100,000 epinephrine was injected into the temporal scalp.  The   nose was then decongested with 4% cocaine topical adrenaline, and after   sufficient time had elapsed for decongestion, 1% lidocaine with 1:100,000   epinephrine was injected in the soft tissue of the nose.  The patient was then   prepped and draped for surgery.  After allowing for vasoconstriction, an 11   blade was used to make an inverted-V mid-columellar incision with a 15 blade.    Marginal incisions were made bilaterally and the nasal skin and soft tissue   envelope was elevated in avascular plane.  The periosteum of the nasal bones was   scored, then elevated.  The alar cartilage was  in the midline.    Junction tunnels were made.  Bilateral mucoperichondrial flaps were elevated   noting a subtle dislocation of septum to the left with a cartilaginous and bony   deviation anteriorly to the right.  The septum was teased out of the floor of   the nose.  A small amount of redundant floor  cartilage was removed and the   septum was sewn to the soft tissue overlying the right side of the nasal spine   with a figure-of-eight 4-0 PDS suture.  The area surrounding the perforation was   elevated carefully to free up all edges extending onto the floor of the nose.    Once this was done, we turned our attention to the dorsum, which was lowered   with a rasp as well as a broken 11 blade.  The deviated portion of quadrilateral   cartilage along the floor of the nose and maxillary crest on the right was   removed with a 15 blade as well as a chisel.  The lower lateral cartilages were   then identified and resected at the cephalic medial border down to 8 mm at the   widest width of lateral crura with 11 blade and iris scissors.  Single dome   units were then placed eccentrically to vinod the caudal edge of the lateral   crura relative to the cephalic.  A right alar Maeve harvested from septal   cartilage was placed with 5-0 PDS sutures to soften the convexity of the lateral   tor.  Medial fading osteotomies were carried out as well as lateral   osteotomies with silver osteotome by making a stab incision anterior to the   anterior insertion of the turbinate in a high-low fashion, nose was fractured   thus narrowing the bridge and straightening the nose.  Extended  graft   was harvested from costal cartilage, which had been soaked in numerous   antibiotic-containing baths to check for warpage and was sewn in place with   multiple 4-0 PDS sutures in mattress fashion.  The mucoperichondrial flaps were   closed over a PDS plate in the region of the perforation with 4-0 plain suture   in interrupted fashion.  After symmetry was achieved, the medial crura   reapproximated with 5-0 PDS sutures over a columellar strut, which was taken   from irradiated costal cartilage.  Alar rim grafts were then placed in a   dissected pocket as to not disrupt the alar rim.  They were placed without   difficulty.  Then,  which  was taken early in the procedure with sharp   dissection and was flattened was used to soften any irregularities along the   dorsum due to the patient's thin skin.  The columellar incision was closed with   6-0 plain suture.  The marginal incisions were closed with 5-0 chromic sutures   and a silastic sheeting was then used to stabilize the septum, sewn in place   with a single 4-0 silk suture.  A small amount of Bactroban-soaked Nu Gauze was   used to pack the nose.  Standard Gelfoam, Steri-Strip, and Aquaplast dressing   was placed.  Throat pack was removed.  Pharynx was carefully suctioned.  Tape   was removed from the eyes, washed with BSS, and cold compresses applied.  The   patient tolerated the procedure well, was awakened, and extubated in the   operating room, brought to Recovery in stable condition.        ______________________________  Benjamin Russell    JJJ/AQS  DD:  06/11/2025  Time:  11:16AM  DT:  06/11/2025  Time:  12:48PM  Job #:  [id number]      OPERATIVE REPORT

## 2025-06-11 NOTE — DISCHARGE INSTRUCTIONS
Rhinoplasty, Care After    Refer to this sheet in the next few weeks. These instructions provide you with information on caring for yourself after your procedure. Your health care provider may also give you more specific instructions. Your treatment has been planned according to current medical practices, but problems sometimes occur. Call your health care provider if you have any problems or questions after your procedure.    WHAT TO EXPECT AFTER THE PROCEDURE    After your procedure, it is typical to have the following:    Swelling of the nose and face. Swelling and bruising around the eyes may increase for the first 2-3 days. It usually goes away in a couple weeks.    Headaches and facial pain. You will be given pain medicine to control this.    Minor bleeding from the nose the first couple days after surgery.    Nasal congestion. Mouth breathing, snoring, and bad breath.    Nasal discharge for 1-2 days after surgery.    HOME CARE INSTRUCTIONS    Do not blow your nose for 6 weeks after surgery. Sneeze through your mouth.    Change nasal drip dressing as often as you need. May keep off if nose not bleeding.    Do not get nasal splint wet.    Keep your head raised (elevated) on a couple pillows. Sleep with your head elevated.    Apply iced gauze pads to your eyes for the next 48 hours.    Avoid strenuous activities for 2-3 weeks. Get plenty of rest and do not get overheated. No heavy lifting/bending over. No driving for 1 week. Protect your nose until healing is complete. No work/school until cleared by doctor.    After cast is removed, then you will clean the incisions under nostrils with Q-tip and Peroxide and apply Bactroban/Polysporin ointment 3 times per day.    Begin Nasamist after nasal splint is removed. This will help relieve your congestion.    Do not wear eye glasses/sunglasses for 6 weeks after surgery. If you wear glasses, tape them to your forehead until your nose is completely healed.    Avoid  unprotected sun exposure and tanning beds for 3 months.    Only take over-the-counter or prescription medicines as directed by your health care provider.    Be patient with the healing process. Day by day your nose will look better. In a couple weeks, cosmetics can be used to cover up the signs of the surgery. Healing takes time, and the final results may not be known for up to a year.    SEEK MEDICAL CARE IF:    You have a fever.    You lose your nasal packing before the time your health care provider says it is to be removed.    You have a pus-like discharge from your nose.    SEEK IMMEDIATE MEDICAL CARE IF:    You have increased pain or swelling in your face or around your eyes after the first 2-3 days.    You have increased bleeding from your nose.    Your headache or facial pain is not relieved with pain medicine.    You have chest pain or shortness of breath        *Avoid aspirin, NSAIDs, and blood thinners until approved by surgeon.    *Avoid nasal sprays and antibiotic ointment until permitted by your surgeon.    *Iced gauze to eyes for 48 hours.    *Do not get cast wet or remove it from nose.    *Stool softeners are recommended  to help prevent constipation Rhinoplasty, Care After    Refer to this sheet in the next few weeks. These instructions provide you with information on caring for yourself after your procedure. Your health care provider may also give you more specific instructions. Your treatment has been planned according to current medical practices, but problems sometimes occur. Call your health care provider if you have any problems or questions after your procedure.    WHAT TO EXPECT AFTER THE PROCEDURE    After your procedure, it is typical to have the following:    Swelling of the nose and face. Swelling and bruising around the eyes may increase for the first 2-3 days. It usually goes away in a couple weeks.    Headaches and facial pain. You will be given pain medicine to control this.    Minor  bleeding from the nose the first couple days after surgery.    Nasal congestion. Mouth breathing, snoring, and bad breath.    Nasal discharge for 1-2 days after surgery.    HOME CARE INSTRUCTIONS    Do not blow your nose for 6 weeks after surgery. Sneeze through your mouth.    Change nasal drip dressing as often as you need. May keep off if nose not bleeding.    Do not get nasal splint wet.    Keep your head raised (elevated) on a couple pillows. Sleep with your head elevated.    Apply iced gauze pads to your eyes for the next 48 hours.    Avoid strenuous activities for 2-3 weeks. Get plenty of rest and do not get overheated. No heavy lifting/bending over. No driving for 1 week. Protect your nose until healing is complete. No work/school until cleared by doctor.    After cast is removed, then you will clean the incisions under nostrils with Q-tip and Peroxide and apply Bactroban/Polysporin ointment 3 times per day.    Begin Nasamist after nasal splint is removed. This will help relieve your congestion.    Do not wear eye glasses/sunglasses for 6 weeks after surgery. If you wear glasses, tape them to your forehead until your nose is completely healed.    Avoid unprotected sun exposure and tanning beds for 3 months.    Only take over-the-counter or prescription medicines as directed by your health care provider.    Be patient with the healing process. Day by day your nose will look better. In a couple weeks, cosmetics can be used to cover up the signs of the surgery. Healing takes time, and the final results may not be known for up to a year.    SEEK MEDICAL CARE IF:    You have a fever.    You lose your nasal packing before the time your health care provider says it is to be removed.    You have a pus-like discharge from your nose.    SEEK IMMEDIATE MEDICAL CARE IF:    You have increased pain or swelling in your face or around your eyes after the first 2-3 days.    You have increased bleeding from your nose.    Your  headache or facial pain is not relieved with pain medicine.    You have chest pain or shortness of breath    MEDICATIONS:    *First dose of Hydrocodone/Norco (narcotic/pain medication), if needed may taken next at _______6:30 PM_. Take with food to prevent nausea.   *First dose of OTC Tylenol, if needed for discomfort, may be taken at ___ANYTIME______.  *Both drugs contain Tylenol.  *No Ibuprofen, Advil, Aleve Aspirin or NSAIDS.    *First dose of Ondansetron (Zofran), for nausea, available at ____4:30 PM__ .    *Resume Cephalexin  (antibiotic) the day after surgery.      ?Take first dose of Arnica dissolving tablet tonight. This is for bruising/swelling prevention.  Resume all other surgery vitamins the day after surgery.      ? Next dose of Acyclovir (antiviral) at ______TOMORROW_____.      ? May use artificial tears for dry, itchy, burning eyes as needed.       ?Iced gauze to eyes for 48 hours.    ?Bring bag of ointments/drops to APPOINTMENT    Follow up scheduled for _______6/16/25 AT 2:30 PM__________    For questions or concerns please call Dr. Fan office at [phone].     *Avoid aspirin, NSAIDs, and blood thinners until approved by surgeon.    *Avoid nasal sprays and antibiotic ointment until permitted by your surgeon.    *Iced gauze to eyes for 48 hours.    *Do not get cast wet or remove it from nose.    *Stool softeners are recommended  to help prevent constipation       Clean incision 3 times per day with a q-tip and peroxide. After cleaning, allow to dry then apply polysporin ointment with clean q-tip.    Keep incision clean for 48 hours. After 48 hours, you may get it lightly wet. Pat dry.    Resume diet as tolerated.

## 2025-06-12 VITALS
TEMPERATURE: 98 F | OXYGEN SATURATION: 97 % | HEART RATE: 86 BPM | SYSTOLIC BLOOD PRESSURE: 118 MMHG | BODY MASS INDEX: 21.69 KG/M2 | WEIGHT: 135 LBS | HEIGHT: 66 IN | DIASTOLIC BLOOD PRESSURE: 76 MMHG | RESPIRATION RATE: 18 BRPM

## (undated) DEVICE — SUT PDS PLUS 4-0 P-3 18IN

## (undated) DEVICE — STAPLER SKIN PROXIMATE WIDE

## (undated) DEVICE — ADHESIVE MASTISOL VIAL 48/BX

## (undated) DEVICE — SUT 5/0 18IN PDS II CLR MO

## (undated) DEVICE — STRIP PKNG STRL 1/2INX5YD

## (undated) DEVICE — BLADE SURG STAINLESS STEEL #11

## (undated) DEVICE — NDL REG BEVEL 27GX1/2IN

## (undated) DEVICE — BLANKET WARMING LOWER BODY

## (undated) DEVICE — GLOVE PROTEXIS LTX MICRO  7.5

## (undated) DEVICE — Device

## (undated) DEVICE — CONTAINER SPECIMEN SCREW 4OZ

## (undated) DEVICE — BLADE TONGUE DEPRESSOR STRL

## (undated) DEVICE — SUT PLN GUT 4-0 SC-1SC-1 1

## (undated) DEVICE — SOL NACL IRR 1000ML BTL

## (undated) DEVICE — CLOSURE SKIN STERI STRIP 1/2X4

## (undated) DEVICE — POSITIONER HEAD ADULT

## (undated) DEVICE — GAUZE DRAIN N WVN 6PLY 4X4IN

## (undated) DEVICE — GOWN POLY REINF BRTH SLV XL

## (undated) DEVICE — SUT 6/0 18IN PLAIN GUT G-1

## (undated) DEVICE — EXTRACTOR STAPLE SQZ HANDLE

## (undated) DEVICE — BLADE SURG STAINLESS STEEL #10

## (undated) DEVICE — SKINMARKER & RULER REGULAR X-F

## (undated) DEVICE — TIP YANKAUERS BULB NO VENT

## (undated) DEVICE — MARKER FN REG DUAL UTIL RULER

## (undated) DEVICE — SUT PDS PLUS 0 CP1 27IN

## (undated) DEVICE — SUPPORT ULNA NERVE PROTECTOR

## (undated) DEVICE — STRIP MEDI WND CLSR 1/4X4IN

## (undated) DEVICE — GAUZE BANDAGE CONFORM 2X75IN

## (undated) DEVICE — TRAY CATH INTMIT POLY 14FR

## (undated) DEVICE — ELECTRODE PATIENT RETURN DISP

## (undated) DEVICE — APPLICATOR STRL COT 2INNR 6IN

## (undated) DEVICE — BASIN EMESIS 700 ML

## (undated) DEVICE — GAUZE CURAD STRIP .25IN 5YD

## (undated) DEVICE — GLOVE SIGNATURE MICRO LTX 6.5

## (undated) DEVICE — BLADE SURG STAINLESS STEEL #15